# Patient Record
Sex: FEMALE | Race: WHITE | NOT HISPANIC OR LATINO | URBAN - METROPOLITAN AREA
[De-identification: names, ages, dates, MRNs, and addresses within clinical notes are randomized per-mention and may not be internally consistent; named-entity substitution may affect disease eponyms.]

---

## 2024-09-27 ENCOUNTER — EMERGENCY (EMERGENCY)
Facility: HOSPITAL | Age: 24
LOS: 1 days | Discharge: HOME | End: 2024-09-27
Attending: EMERGENCY MEDICINE
Payer: COMMERCIAL

## 2024-09-27 VITALS
SYSTOLIC BLOOD PRESSURE: 127 MMHG | TEMPERATURE: 98 F | DIASTOLIC BLOOD PRESSURE: 77 MMHG | HEART RATE: 84 BPM | WEIGHT: 210.1 LBS | RESPIRATION RATE: 16 BRPM | HEIGHT: 70 IN | OXYGEN SATURATION: 99 %

## 2024-09-27 PROCEDURE — 12001 RPR S/N/AX/GEN/TRNK 2.5CM/<: CPT

## 2024-09-27 PROCEDURE — 90471 IMMUNIZATION ADMIN: CPT

## 2024-09-27 PROCEDURE — 73140 X-RAY EXAM OF FINGER(S): CPT | Mod: 26,LT

## 2024-09-27 PROCEDURE — 73140 X-RAY EXAM OF FINGER(S): CPT

## 2024-09-27 PROCEDURE — 90715 TDAP VACCINE 7 YRS/> IM: CPT

## 2024-09-27 PROCEDURE — 99283 EMERGENCY DEPT VISIT LOW MDM: CPT | Mod: 25

## 2024-09-27 PROCEDURE — 99284 EMERGENCY DEPT VISIT MOD MDM: CPT | Mod: 25

## 2024-09-27 RX ORDER — TETANUS TOXOID, REDUCED DIPHTHERIA TOXOID AND ACELLULAR PERTUSSIS VACCINE, ADSORBED 5; 2.5; 8; 8; 2.5 [IU]/.5ML; [IU]/.5ML; UG/.5ML; UG/.5ML; UG/.5ML
0.5 SUSPENSION INTRAMUSCULAR ONCE
Refills: 0 | Status: COMPLETED | OUTPATIENT
Start: 2024-09-27 | End: 2024-09-27

## 2024-09-27 RX ADMIN — TETANUS TOXOID, REDUCED DIPHTHERIA TOXOID AND ACELLULAR PERTUSSIS VACCINE, ADSORBED 0.5 MILLILITER(S): 5; 2.5; 8; 8; 2.5 SUSPENSION INTRAMUSCULAR at 17:39

## 2024-09-27 NOTE — ED PROVIDER NOTE - NSFOLLOWUPINSTRUCTIONS_ED_ALL_ED_FT
Follow up in 7-10 days to have stitches to removed. You can have them removed either at the emergency room or urgent care.    Keep wound clean and dry for 24 hours. After the wound can get wet but no full submersion into water.     Wash the area daily with soap and water and apply bacitracin/neosporin or any other antibiotic ointment.     You can take motrin/tylenol as needed for pain.     Sutured Wound Care    Sutures are stitches that can be used to close wounds. Sutures come in different materials. They may break down as your wound heals (absorbable), or they may need to be removed (nonabsorbable). Taking care of your wound properly can help to prevent pain and infection. It can also help your wound heal more quickly. Follow instructions from your health care provider about how to care for your sutured wound.    Supplies needed:  •Soap and water.  •A clean, dry towel.  •Wound cleanser or saline, if needed.  •A clean gauze or bandage (dressing), if needed.  •Antibiotic ointment, if told by your health care provider.    How to care for your sutured wound  •Keep the wound completely dry for the first 24 hours, or for as long as told by your health care provider. After 24–48 hours, you may shower or bathe as told by your health care provider. Do not soak or submerge the wound in water until the sutures have been removed.    •After the first 24 hours, clean the wound once a day, or as often as told by your health care provider. Use the following steps:  •Wash and rinse the wound as told by your health care provider.  •Pat the wound dry with a clean towel. Do not rub the wound.  •After cleaning the wound, apply a thin layer of antibiotic ointment as told by your health care provider. This will prevent infection and keep the dressing from sticking to the wound.    •Follow instructions from your health care provider about how to change your dressing. Make sure you:  •Wash your hands with soap and water for at least 20 seconds before and after you change your dressing. If soap and water are not available, use hand .  •Change your dressing at least once a day, or as often as told by your health care provider. If your dressing gets wet or dirty, change it.  •Leave sutures and other skin closures, such as adhesive strips or skin glue, in place. These skin closures may need to stay in place for 2 weeks or longer. If adhesive strip edges start to loosen and curl up, you may trim the loose edges. Do not remove adhesive strips completely unless your health care provider tells you to do that.    •Check your wound every day for signs of infection. Watch for:  •Redness, swelling, or pain.  •Fluid or blood.  •New warmth, a rash, or hardness at the wound site.  •Pus or a bad smell.  •Have the sutures removed as told by your health care provider.    Follow these instructions at home:    Medicines   •Take or apply over-the-counter and prescription medicines only as told by your health care provider.  •If you were prescribed an antibiotic medicine or ointment, take or apply it as told by your health care provider. Do not stop using the antibiotic even if your condition improves.    General instructions   •To help reduce scarring after your wound heals, cover your wound with clothing or apply sunscreen of at least 30 SPF whenever you are outside.  • Do not scratch or pick at your wound.  •Avoid stretching your wound.  •Raise (elevate) the injured area above the level of your heart while you are sitting or lying down, if possible.  •Eat a diet that includes protein, vitamin A, and vitamin C to help the wound heal.  •Drink enough fluid to keep your urine pale yellow.  •Keep all follow-up visits. This is important.    Contact a health care provider if:  •You received a tetanus shot and you have swelling, severe pain, redness, or bleeding at the injection site.  •Your wound breaks open or you notice something coming out if it, such as wood or glass.  •You have any of these signs of infection:  •Redness, swelling, or pain around your wound.  •Fluid or blood coming from your wound.  •New warmth, a rash, or hardness around the wound.  •A fever.  •The skin near your wound changes color.  •You have pain that does not get better with medicine.  •You develop numbness around the wound.    Get help right away if:  •You develop severe swelling or more pain around your wound.  •You have pus or a bad smell coming from your wound.  •You develop painful lumps near your wound or anywhere on your body.  •You have a red streak spreading out from your wound.  •The wound is on your hand or foot and:  •Your fingers or toes look pale or bluish.  •You cannot properly move a finger or toe.  •You have numbness that is spreading down your hand, foot, fingers, or toes.    Summary    •Sutures are stitches that can be used to close wounds.  •Taking care of your wound properly can help to prevent pain and infection.  •Keep the wound completely dry for the first 24 hours, or for as long as told by your health care provider. After 24–48 hours, you may shower or bathe as told by your health care provider.  •To help with healing, eat foods that are rich in protein, vitamin A, and vitamin C.    This information is not intended to replace advice given to you by your health care provider. Make sure you discuss any questions you have with your health care provider.    If you experience any new or worsening symptoms or if you are concerned you can always come back to the emergency for a re-evaluation.

## 2024-09-27 NOTE — ED ADULT NURSE NOTE - OBJECTIVE STATEMENT
Pt presents to Ed c/o laceration in her left 2nd finger. States she was cutting an avocado and it cut into her skin.

## 2024-09-27 NOTE — ED PROVIDER NOTE - ATTENDING APP SHARED VISIT CONTRIBUTION OF CARE
Agree with NP H&P.  23 year old female with no past medical history presents with left 2nd finger laceration after cutting an avocado.   Wound to be repaired in ED, will update tetanus.

## 2024-09-27 NOTE — ED PROVIDER NOTE - PHYSICAL EXAMINATION
Left 2nd finger - 1 cm laceration noted to the lateral aspect. Full ROM of MCP, DIP, PIP. +Sensation.

## 2024-09-27 NOTE — ED PROVIDER NOTE - CLINICAL SUMMARY MEDICAL DECISION MAKING FREE TEXT BOX
23 year old female with no past medical history presents with left 2nd finger laceration. Patient reports she was cutting an avocado and cut her finger with the knife. Unknown last tetanus.     Will obtain xray to r/o fracture/FB. Update tetanus, suture.

## 2024-09-27 NOTE — ED PROVIDER NOTE - PATIENT PORTAL LINK FT
You can access the FollowMyHealth Patient Portal offered by [facility] by registering at the following website: [email or web address]. By joining [facility]’s FollowMyHealth portal, you will also be able to view your health information using other applications (apps) compatible with our system.

## 2024-09-27 NOTE — ED PROVIDER NOTE - OBJECTIVE STATEMENT
IMTS Focus Note    Notified by RN that tele tech noticed possible ST elevation on patient's telemetry.  Evaluated patient at bedside.  He notes that the chest pain began this morning when he rolled over in bed.  He describes it as a non-radiating substernal pressure, 7/10.  It is worse with movement and deep breaths, better when he lays still.  No shortness of breath, palpitations, nausea, diaphoresis.    /66 (BP Location: RUE - Right upper extremity, Patient Position: Semi-Spencer's)   Pulse 81   Temp 97.3 °F (36.3 °C) (Temporal)   Resp 19   Ht 5' 11\" (1.803 m)   Wt 99.5 kg   BMI 30.59 kg/m²   BSA 2.19 m²     On exam, he is somewhat uncomfortable in bed, fidgety (has baseline anxiety).  Not diaphoretic.  Auscultation reveals regular rate and rhythm, no murmurs.  Chest wall is quite tender to palpation over the sternum and bilateral pectoral muscles.    Patient's chest pain appears to be non-cardiac in nature, likely musculoskeletal.  Will obtain stat troponin and 12 lead EKG to rule out ACS.    Signed,  Johana Ty DO  PGY-1 Internal Medicine  Pager [phone]    Addendum  Troponin negative.  12 lead EKG without ST changes.   Lidocaine patch ordered.         23 year old female with no past medical history presents with left 2nd finger laceration. Patient reports she was cutting an avocado and cut her finger with the knife. Unknown last tetanus.

## 2024-10-07 ENCOUNTER — NON-APPOINTMENT (OUTPATIENT)
Age: 24
End: 2024-10-07